# Patient Record
(demographics unavailable — no encounter records)

---

## 2024-10-22 NOTE — HISTORY OF PRESENT ILLNESS
[de-identified] : Pt 1 year s/p Thyroid lobectomy for malignancy doing well without complaints had labs done yesterday for DR Beltre All old and new reports reviewed

## 2024-10-22 NOTE — PHYSICAL EXAM
[de-identified] : well healed scar [de-identified] : well healed scar [Midline] : located in midline position [Normal] : orientation to person, place, and time: normal

## 2024-11-18 NOTE — ASSESSMENT
[FreeTextEntry1] : 1. Thyroid Cancer 2. Postsurgical Hypothyroidism  Surgery: Right lobectomy and isthmusectomy, 10/25/2023, Dr. Young Cerda Pathology: Unifocal right 1.9 cm PTC, partially encapsulated, partially invasive, with predominant follicular growth pattern and some classic morphology. Additional 2.2 cm NIFTP. No increased mitoses/tumor necrosis, no angiolymphatic invasion, no ETE, margins negative. 8/8 level 6 LN negative for carcinoma. 2015 LAURA Risk: Low AJCC 8th edition TNM Stage: T1b N0a MX, stage I DASILVA Treatment: None  Surveillance: Thyroglobulin 14.8-23.9 after hemithyroidectomy. Most recently 14.8 in 8/2024 Thyroid US 1/2/2024: Multiple left-sided subcentimeter nodules/cysts, see scanned report. Do not meet FNA criteria.  PLAN: -She is status post hemithyroidectomy for a low risk intrathyroidal PTC and NIFTP.  We discussed that low risk thyroid cancers are mostly adequately treated with surgical resection, without need for any additional treatment. -Low risk nodules and cysts in left lobe.  Repeat thyroid US in 1 year, around 1/2025.  Prescription provided today. -Can check thyroglobulin annually, though limited value after hemithyroidectomy -Maintain TSH goal 0.5-2 for LAURA low risk thyroid cancer.  Most recent TSH 2.78 in 10/2024, not on levothyroxine.  Can continue to monitor without treatment if TSH staying around 2-3.     3. Diabetes Mellitus  Type: 2 A1c: 7% 11/18/2024 Current Regimen: Metformin 500 mg twice daily, Jardiance 25 mg daily  PLAN:  - Regimen: Increase metformin to 1000 mg in the morning/500 mg in the evening and continue Jardiance 25 mg daily, encouraged compliance -Continue lifestyle modifications such as dietary modification and increased physical exercise for further A1c reduction -Check A1c every 3 to 4 months.  Check CMP, vitamin B12, urine ACR annually. - Preventive:         Nephropathy screening: Negative, 8/2024        Retinopathy screening: Follows with ophthalmology annually -Hypertension: Continue ramipril 2.5 mg daily -Hyperlipidemia: Continue atorvastatin 20 mg daily.    RTC in 4 months.  Analilia Beltre MD [facility] at [address], [address] Ph: [phone] Fax: [phone]

## 2024-11-18 NOTE — PHYSICAL EXAM
[TextEntry] : PHYSICAL EXAMINATION: Vital signs from today's encounter reviewed.  GENERAL: No acute distress, clinically eukinetic, normal appearance HEAD: Normocephalic, atraumatic EYES: conjunctivae are pink and moist, no icterus, no proptosis  NECK: Well-healed thyroidectomy incision site, non-tender, no adenopathy CARDIOVASCULAR: well-perfused extremities, no peripheral edema RESPIRATORY: normal chest expansion with good pulmonary effort, no acute respiratory distress NEUROLOGIC: alert and oriented, no evident focal deficits, no tremors  PSYCHIATRIC: mood and affect are normal

## 2024-11-18 NOTE — HISTORY OF PRESENT ILLNESS
[FreeTextEntry1] : CHIEF COMPLAINT: Thyroid cancer, type 2 diabetes Surgeon: Dr. Young Cerda   HISTORY OF PRESENTING ILLNESS: The patient is a 62-year-old female being seen in the office today for evaluation of thyroid cancer, type 2 diabetes.  Interval history 11/18/2024: Reports that she was recently diagnosed with breast cancer last week, still undergoing evaluation and will eventually have surgery.   THYROID CANCER: She was initially noted to have thyroid nodules on ultrasound done with PCP, for unclear reasons. Thyroid US 7/2022: Bilateral multiple thyroid nodules, largest RLP 2.9 cm TR 4 nodule. FNA 6/9/2023 of RLP 2.9 cm nodule: AUS, with ThyroSeq V3 positive for NRAS mutation.   Surgery: Right lobectomy and isthmusectomy, 10/25/2023, Dr. Young Cerda Pathology: Unifocal right 1.9 cm PTC, partially encapsulated, partially invasive, with predominant follicular growth pattern and some classic morphology.  Additional 2.2 cm NIFTP.  No increased mitoses/tumor necrosis, no angiolymphatic invasion, no ETE, margins negative.  8/8 level 6 LN negative for carcinoma. 2015 LAURA Risk: Low AJCC 8th edition TNM Stage: T1b N0a MX, stage I DASILVA Treatment: None  Surveillance: Thyroglobulin 14.8-23.9 after hemithyroidectomy.  Most recently 14.8 in 8/2024. Thyroid US 1/2/2024: Multiple left-sided subcentimeter nodules/cysts, see scanned report.  Do not meet FNA criteria.   Postsurgical Hypothyroidism: She has not been started on levothyroxine after hemithyroidectomy.  TSH has occasionally been elevated up to 4.3 after hemithyroidectomy.  Most recently TSH 2.78 in 10/2024  Symptoms: Reports that energy levels are good.  Menopause was in her 50s.  No palpitations/tremors, no constipation/diarrhea.  Incision site has healed well.  Working on lifestyle modifications for weight loss and diabetes management.  Voice is okay, no dysphagia or dyspnea.  Endorsing some hair loss.  No family history of thyroid cancer or thyroid disease. No personal history of radiation exposure to the head and neck area.  TYPE 2 DIABETES: Reports that she was diagnosed with type 2 diabetes in her late 20s, but never needed medications. She was started on medications for diabetes in her 50s.  A1c: 7% 11/18/2024, previously was 6.8% Current regimen: Metformin 500 mg twice daily, Jardiance 25 mg daily Reports that she self decreased the metformin to 500 mg twice daily, was previously taking 1000 mg twice daily. Hypertension: Ramipril 2.5 mg daily Hyperlipidemia: Atorvastatin 20 mg daily, self decreased she was previously taking 40 mg daily  Denies any known complications of diabetes.  No CAD/CVA/PAD.  No neuropathy/retinopathy/nephropathy. Nephropathy screening: Urine ACR negative, 8/2024 Retinopathy screening: Follows with ophthalmology annually  Diet: Mainly vegetables, lentils, fruits.  Mainly drinks water with lemon's.  Reports that when she eats meat, has joint pains. Exercise: Tries to increase exercise, mainly walking.  Checks blood sugars twice a day, fasting and 2 hours after breakfast.  Reports that all blood sugars are between 90 and 110.  Social history: Originally from [place]

## 2025-03-17 NOTE — ASSESSMENT
[FreeTextEntry1] : 1. Thyroid Cancer 2. Postsurgical Hypothyroidism  Surgery: Right lobectomy and isthmusectomy, 10/25/2023, Dr. Young Cerda Pathology: Unifocal right 1.9 cm PTC, partially encapsulated, partially invasive, with predominant follicular growth pattern and some classic morphology. Additional 2.2 cm NIFTP. No increased mitoses/tumor necrosis, no angiolymphatic invasion, no ETE, margins negative. 8/8 level 6 LN negative for carcinoma. 2015 LAURA Risk: Low AJCC 8th edition TNM Stage: T1b N0a MX, stage I DASILVA Treatment: None  Surveillance: Thyroglobulin 14.8-23.9 after hemithyroidectomy. Most recently 14.8 in 8/2024 Thyroid US 1/2/2024: Multiple left-sided subcentimeter nodules/cysts, see scanned report. Do not meet FNA criteria. Thyroid US 3/6/2025: Right thyroid bed MARGE, left lobe with benign-appearing spongiform and cystic nodules TR ones up to 6 mm in size. No abnormal/suspicious lymphadenopathy.  PLAN: -She is status post hemithyroidectomy for a low risk intrathyroidal PTC and NIFTP.  We discussed that low risk thyroid cancers are mostly adequately treated with surgical resection, without need for any additional treatment. -Low risk nodules and cysts in left lobe.  Repeat thyroid US in 18 months, around 9/2026.   -Limited role for thyroglobulin monitoring after hemithyroidectomy -Maintain TSH goal 0.5-2 for LAURA low risk thyroid cancer.  Most recent TSH 2.78 in 10/2024, not on levothyroxine.  Can continue to monitor without treatment if TSH staying around 2-3.     3. Diabetes Mellitus  Type: 2 A1c: 7% 3/17/2025 Current Regimen: Metformin 500 mg twice daily, Jardiance 25 mg daily  PLAN:  - Regimen: Continue metformin 500 mg twice daily and continue Jardiance 25 mg daily, encouraged compliance.  She prefers to work on lifestyle modifications rather than increasing metformin dose further. -Continue lifestyle modifications such as dietary modification and increased physical exercise for further A1c reduction -Check A1c every 3 to 4 months.  Check CMP, vitamin B12, urine ACR annually. - Preventive:         Nephropathy screening: Negative, 8/2024        Retinopathy screening: Follows with ophthalmology annually -Hypertension: Continue ramipril 2.5 mg daily -Hyperlipidemia: Continue atorvastatin 20 mg daily.    RTC in 4 to 6 months.  Analilia Beltre MD [facility] at [place]  [address] [place] [address], [address] Ph: [phone] Fax: [phone]

## 2025-03-17 NOTE — HISTORY OF PRESENT ILLNESS
[FreeTextEntry1] : CHIEF COMPLAINT: Thyroid cancer, type 2 diabetes Surgeon: Dr. Young Cerda   HISTORY OF PRESENTING ILLNESS: The patient is a 63-year-old female being seen in the office today for evaluation of thyroid cancer, type 2 diabetes.  THYROID CANCER: She was initially noted to have thyroid nodules on ultrasound done with PCP, for unclear reasons. Thyroid US 7/2022: Bilateral multiple thyroid nodules, largest RLP 2.9 cm TR 4 nodule. FNA 6/9/2023 of RLP 2.9 cm nodule: AUS, with ThyroSeq V3 positive for NRAS mutation.   Surgery: Right lobectomy and isthmusectomy, 10/25/2023, Dr. Young Cerda Pathology: Unifocal right 1.9 cm PTC, partially encapsulated, partially invasive, with predominant follicular growth pattern and some classic morphology.  Additional 2.2 cm NIFTP.  No increased mitoses/tumor necrosis, no angiolymphatic invasion, no ETE, margins negative.  8/8 level 6 LN negative for carcinoma. 2015 LAURA Risk: Low AJCC 8th edition TNM Stage: T1b N0a MX, stage I DASILVA Treatment: None  Surveillance: Thyroglobulin 14.8-23.9 after hemithyroidectomy.  Most recently 14.8 in 8/2024. Thyroid US 1/2/2024: Multiple left-sided subcentimeter nodules/cysts, see scanned report.  Do not meet FNA criteria. Thyroid US 3/6/2025: Right thyroid bed MARGE, left lobe with benign-appearing spongiform and cystic nodules TR ones up to 6 mm in size.  No abnormal/suspicious lymphadenopathy.   Postsurgical Hypothyroidism: She has not been started on levothyroxine after hemithyroidectomy.  TSH has occasionally been elevated up to 4.3 after hemithyroidectomy.  Most recently TSH 2.78 in 10/2024  Symptoms 3/17/2025: Reports feeling tired.  No palpitations or tremors, no constipation or diarrhea, no heat or cold intolerance.  Menopause was in her 50s.  Neck incision site has healed well.  Voice is okay, no dysphagia or dyspnea.  Endorsing some hair loss.  No family history of thyroid cancer or thyroid disease. No personal history of radiation exposure to the head and neck area.  TYPE 2 DIABETES: Reports that she was diagnosed with type 2 diabetes in her late 20s, but never needed medications. She was started on medications for diabetes in her 50s.  A1c: 7% 3/17/2025, stable from before. Current regimen: Metformin 500 mg twice daily, Jardiance 25 mg daily Does not like to take metformin, was previously on 1000 mg twice daily but decreased due to side effects. Hypertension: Ramipril 2.5 mg daily Hyperlipidemia: Atorvastatin 20 mg daily  Denies any known complications of diabetes.  No CAD/CVA/PAD.  No neuropathy/retinopathy/nephropathy. Nephropathy screening: Urine ACR negative, 8/2024 Retinopathy screening: Follows with ophthalmology annually  Loves to eat rice, has been trying to cut down.  Social history: Originally from [place]

## 2025-04-22 NOTE — PHYSICAL EXAM
[de-identified] : well healed scar [de-identified] : well healed scar [Midline] : located in midline position [Normal] : orientation to person, place, and time: normal

## 2025-04-22 NOTE — ASSESSMENT
[FreeTextEntry1] : s/p Thyroid lobectomy Thyroid malignancy labs and scans per DR Beltre f/u 1 year sooner if needed

## 2025-04-22 NOTE — HISTORY OF PRESENT ILLNESS
[de-identified] : Pt 18 months s/p Thyroid lobectomy for malignancy doing well without complaints had labs and sono done by Dr Beltre all reports reviewed